# Patient Record
Sex: FEMALE | Race: OTHER | Employment: PART TIME | URBAN - METROPOLITAN AREA
[De-identification: names, ages, dates, MRNs, and addresses within clinical notes are randomized per-mention and may not be internally consistent; named-entity substitution may affect disease eponyms.]

---

## 2017-01-11 PROBLEM — O30.009 TWIN PREGNANCY, TWINS DISCORDANT: Status: ACTIVE | Noted: 2017-01-11

## 2017-01-11 PROBLEM — O36.5990 TWIN PREGNANCY, TWINS DISCORDANT: Status: ACTIVE | Noted: 2017-01-11

## 2017-01-11 RX ORDER — TERBUTALINE SULFATE 1 MG/ML
0.25 INJECTION SUBCUTANEOUS
Status: CANCELLED | OUTPATIENT
Start: 2017-01-11

## 2017-01-11 RX ORDER — SODIUM CHLORIDE 0.9 % (FLUSH) 0.9 %
5-10 SYRINGE (ML) INJECTION EVERY 8 HOURS
Status: CANCELLED | OUTPATIENT
Start: 2017-01-11

## 2017-01-11 RX ORDER — SODIUM CHLORIDE 0.9 % (FLUSH) 0.9 %
5-10 SYRINGE (ML) INJECTION AS NEEDED
Status: CANCELLED | OUTPATIENT
Start: 2017-01-11

## 2017-01-11 RX ORDER — OXYTOCIN IN 5 % DEXTROSE 30/500 ML
.5-2 PLASTIC BAG, INJECTION (ML) INTRAVENOUS CONTINUOUS
Status: CANCELLED | OUTPATIENT
Start: 2017-01-12

## 2017-01-12 ENCOUNTER — ANESTHESIA (OUTPATIENT)
Dept: LABOR AND DELIVERY | Age: 42
End: 2017-01-12
Payer: COMMERCIAL

## 2017-01-12 ENCOUNTER — ANESTHESIA EVENT (OUTPATIENT)
Dept: LABOR AND DELIVERY | Age: 42
End: 2017-01-12
Payer: COMMERCIAL

## 2017-01-12 PROCEDURE — 77030007879 HC KT SPN EPDRL TELE -B: Performed by: ANESTHESIOLOGY

## 2017-01-12 PROCEDURE — 74011000250 HC RX REV CODE- 250

## 2017-01-12 PROCEDURE — 74011250636 HC RX REV CODE- 250/636

## 2017-01-12 RX ORDER — LIDOCAINE HYDROCHLORIDE AND EPINEPHRINE 15; 5 MG/ML; UG/ML
INJECTION, SOLUTION EPIDURAL AS NEEDED
Status: DISCONTINUED | OUTPATIENT
Start: 2017-01-12 | End: 2017-01-12 | Stop reason: HOSPADM

## 2017-01-12 RX ORDER — OXYTOCIN/RINGER'S LACTATE 20/1000 ML
PLASTIC BAG, INJECTION (ML) INTRAVENOUS
Status: DISCONTINUED | OUTPATIENT
Start: 2017-01-12 | End: 2017-01-12 | Stop reason: HOSPADM

## 2017-01-12 RX ORDER — FENTANYL CITRATE 50 UG/ML
INJECTION, SOLUTION INTRAMUSCULAR; INTRAVENOUS AS NEEDED
Status: DISCONTINUED | OUTPATIENT
Start: 2017-01-12 | End: 2017-01-12 | Stop reason: HOSPADM

## 2017-01-12 RX ADMIN — LIDOCAINE HYDROCHLORIDE AND EPINEPHRINE 5 ML: 15; 5 INJECTION, SOLUTION EPIDURAL at 05:33

## 2017-01-12 RX ADMIN — FENTANYL CITRATE 100 MCG: 50 INJECTION, SOLUTION INTRAMUSCULAR; INTRAVENOUS at 05:34

## 2017-01-12 RX ADMIN — Medication: at 08:14

## 2017-01-12 NOTE — ANESTHESIA PREPROCEDURE EVALUATION
Anesthetic History   No history of anesthetic complications            Review of Systems / Medical History  Patient summary reviewed, nursing notes reviewed and pertinent labs reviewed    Pulmonary  Within defined limits                 Neuro/Psych   Within defined limits           Cardiovascular                  Exercise tolerance: >4 METS     GI/Hepatic/Renal  Within defined limits              Endo/Other  Within defined limits           Other Findings              Physical Exam    Airway  Mallampati: II  TM Distance: 4 - 6 cm  Neck ROM: normal range of motion        Cardiovascular  Regular rate and rhythm,  S1 and S2 normal,  no murmur, click, rub, or gallop             Dental  No notable dental hx       Pulmonary  Breath sounds clear to auscultation               Abdominal  GI exam deferred       Other Findings            Anesthetic Plan    ASA: 2  Anesthesia type: epidural      Post-op pain plan if not by surgeon: indwelling epidural catheter      Anesthetic plan and risks discussed with: Patient    Risk and benefits fully explained to the patient including bleeding, headache, nerve damage, infection, nausea, back pain, and hemodynamic changes. Patient understands and agrees to the procedure.

## 2017-01-12 NOTE — ANESTHESIA POSTPROCEDURE EVALUATION
Post-Anesthesia Evaluation and Assessment    Cardiovascular Function/Vital Signs  Visit Vitals    /75    Pulse 77    Temp 36.5 °C (97.7 °F)    Resp 20    Ht 5' 3\" (1.6 m)    Wt 73.5 kg (162 lb)    SpO2 98%    Breastfeeding Unknown    BMI 28.7 kg/m2       Patient is status post Twin Delivery    Nausea/Vomiting: Controlled. Postoperative hydration reviewed and adequate. Pain:  Pain Scale 1: Numeric (0 - 10) (01/12/17 0120)  Pain Intensity 1: 3 (01/11/17 2314)   Managed. Neurological Status:   Neuro (WDL): Within Defined Limits (01/11/17 2314)   At baseline. Mental Status and Level of Consciousness: Baseline and stable. Pulmonary Status:   O2 Device: Room air (01/12/17 0820)   Adequate oxygenation and airway patent. Complications related to anesthesia: None    Post-anesthesia assessment completed. No concerns. Patient has met all discharge requirements.     Signed By: Ene Phipps MD

## 2017-01-12 NOTE — ANESTHESIA PROCEDURE NOTES
Epidural Block    Start time: 1/12/2017 5:26 AM  End time: 1/12/2017 5:34 AM  Performed by: Jair Chacon by: Unk Savage     Pre-Procedure  Indication: at surgeon's request, post-op pain management, procedure for pain, primary anesthetic and labor epidural    Preanesthetic Checklist: patient identified, risks and benefits discussed, anesthesia consent, site marked, patient being monitored, timeout performed and anesthesia consent      Epidural:   Patient position:  Seated  Prep region:  Lumbar  Prep: Chlorhexidine    Location:  L3-4    Needle and Epidural Catheter:   Needle Type:  Tuohy  Needle Gauge:  17 G  Injection Technique:  Loss of resistance using saline  Attempts:  1  Catheter Size:  20 G  Catheter at Skin Depth (cm):  12  Depth in Epidural Space (cm):  5  Events: no blood with aspiration, no cerebrospinal fluid with aspiration, no paresthesia and negative aspiration test    Test Dose:  Lidocaine 1.5% w/ epi and negative    Assessment:   Catheter Secured:  Tegaderm and tape  Insertion:  Uncomplicated  Patient tolerance:  Patient tolerated the procedure well with no immediate complications

## 2017-01-13 NOTE — ANESTHESIA POSTPROCEDURE EVALUATION
Residual numbness:  No  Residual weakness:  No  Residual tingling:   No    Problems with ambulation:   No  Problems with urination:  No    Presence of headache:   No  Presence of backache:   Yes. States it is getting better. She will inform staff if it persists.

## 2017-01-14 PROBLEM — O99.019 ANEMIA DURING PREGNANCY: Status: ACTIVE | Noted: 2017-01-14

## 2017-04-18 ENCOUNTER — HOSPITAL ENCOUNTER (OUTPATIENT)
Dept: PREADMISSION TESTING | Age: 42
Discharge: HOME | End: 2017-04-18
Payer: COMMERCIAL

## 2017-04-18 LAB
HCT VFR BLD AUTO: 36.8 % (ref 35–45)
HGB BLD-MCNC: 12.2 G/DL (ref 12–16)

## 2017-04-18 PROCEDURE — 36415 COLL VENOUS BLD VENIPUNCTURE: CPT | Performed by: SURGERY

## 2017-04-18 PROCEDURE — 85018 HEMOGLOBIN: CPT | Performed by: SURGERY

## 2017-05-02 NOTE — H&P
Assessment/Plan  # Detail Type Description    1. Assessment Lump in breast (N63). Impression left breast mass, discussed possibilities of biopsy, suggest excisional bx as I'm not very suspicious of this mass. Patient Plan left breast bx                  This 39year old female presents for Breast lump mass. History of Present Illness:  1. Breast lump mass   Discovered 2 weeks ago. The method of discovery was breast self exam.  Status: asymptomatic. The Patient reports no pain. Mammography was done; the views are standard and cone down compression; the category was 4; the finding(s) is/are smooth-walled mass and 13 mm mass 2 ocl. The mass location is left upper outer quadrant and Left Breast.  The mass can be described as firm. Context includes menstrual.  Denies aggravating factors. Denies relieving factors. Associated symptoms include Pain in Right Axillary. Pertinent negatives include asymmetry, breast pain, dimpling, erythema, fever, lymphadenopathy, nipple itching, nipple retraction and skin changes. PAST MEDICAL/SURGICAL HISTORY  (Detailed)      Family History:  (Detailed)    Social History:  (Detailed)  Tobacco use reviewed. Preferred language is [place]. MARITAL STATUS/FAMILY/SOCIAL SUPPORT  Currently . Smoking status: Never smoker. SMOKING STATUS  Use Status Type Smoking Status Usage Per Day Years Used Total Pack Years   no/never  Never smoker          Latter-day/SPIRITUAL  The patient has None Zoroastrianism affiliation. Medication Reconciliation  Medications reconciled today. Patient is on no medications. Allergies:  Ingredient Reaction Medication Name Comment   NO KNOWN ALLERGIES        Review of Systems  System Neg/Pos Details   Constitutional Positive Night sweats. Constitutional Negative Fever and weight loss. ENMT Positive Vertigo, Voice change. ENMT Negative Hearing loss and tinnitus. Eyes Negative Diplopia and vision loss. Respiratory Negative Asthma, cough, dyspnea, hemoptysis, known TB exposure and wheezing. Cardio Negative Chest pain, claudication, edema, irregular heartbeat/palpitations and thrombophlebitis. GI Positive Hemorrhoids. GI Negative Bloating, dysphagia, jaundice and reflux.  Negative Dysuria, nocturia, passage stone/gravel and urinary incontinence. Endocrine Positive Cold intolerance. Endocrine Negative Goiter. Neuro Negative Headache and syncope. Integumentary Negative Breast asymmetry, breast dimpling, change in shape/size of mole(s), erythema, nipple itching, nipple retraction, skin changes and skin lesion. MS Positive Back pain, Bone/joint symptoms. Hema/Lymph Negative Easy bleeding, easy bruising and lymphadenopathy. Allergic/Immuno Negative Contact allergy and contact dermatitis. Reproductive Positive Menses (Last menses was 03/18/2017). Reproductive Negative Breast pain. Vital Signs     Gynecologic History   Last menses was 03/18/2017. Height  Time ft in cm Last Measured Height Position   9:02 AM 5.0 3.00 160.02 04/10/2017      Weight/BSA/BMI  Time lb oz kg Context BMI kg/m2 BSA m2   9:02 .00  59.874 dressed with shoes 23.38      Blood Pressure  Time BP mm/Hg Position Side Site Method Cuff Size   9:02 /74 sitting right arm automatic pediatric     Temperature/Pulse/Respiration  Time Temp F Temp C Temp Site Pulse/min Pattern Resp/ min   9:02 AM 98.50 36.94 ear 71 regular      Measured By  Time Measured by   9:02 AM Edna Rivera       Physical Exam:  Exam Findings Details   Constitutional Normal No acute distress. Well nourished. Well developed. Eyes Normal General - Right: Normal, Left: Normal. Sclera - Right: Normal, Left: Normal.   Neck Exam Normal Inspection - Normal. Palpation - Normal. Parotid gland - Normal. Thyroid gland - Normal. Submandibular lymph nodes - Normal. Cervical lymph nodes - Normal.   Lymph Detail Normal Occipital. Postauricular. Preauricular. Submental. Submandibular. Parotid. Anterior cervical. Posterior cervical. Supraclavicular. Axillary. Breast * Palpation - Left: mass, Left: 1 cm 2 ocl. Breast Normal Inspection - Bilateral: Normal. Palpation - Right: Normal. Nipples - Bilateral: Normal. Lymph nodes - Normal.   Respiratory Normal Cough - Absent. Effort - Normal.   Cardiovascular Normal Inspection - JVD: Absent. Heart rate - Regular rate. Rhythm - Regular. Skin * Inspection - General inspection: warm and dry. Musculoskeletal Normal Visual overview of all four extremities is normal. Gait - Normal.   Extremity Normal No Cyanosis. No Edema. Neurological Normal Level of consciousness - Normal. Orientation - Normal. Memory - Normal.   Psychiatric Normal Orientation - Oriented to time, place, person & situation. No agitation. Not anxious. Appropriate mood and affect.        Completed by:     Alanna Hercules 04/10/2017 1:15 PM   Document generated by:  Jersey Espana 04/10/2017 01:15 PM     -----------------------------------------------------------------------------------------------------------                          Electronically signed by Jersey Espana MD on 04/19/2017 05:54 PM

## 2017-05-03 ENCOUNTER — ANESTHESIA EVENT (OUTPATIENT)
Dept: SURGERY | Age: 42
End: 2017-05-03
Payer: COMMERCIAL

## 2017-05-03 RX ORDER — SODIUM CHLORIDE, SODIUM LACTATE, POTASSIUM CHLORIDE, CALCIUM CHLORIDE 600; 310; 30; 20 MG/100ML; MG/100ML; MG/100ML; MG/100ML
1000 INJECTION, SOLUTION INTRAVENOUS CONTINUOUS
Status: CANCELLED | OUTPATIENT
Start: 2017-05-03

## 2017-05-03 RX ORDER — MAGNESIUM SULFATE 100 %
4 CRYSTALS MISCELLANEOUS AS NEEDED
Status: CANCELLED | OUTPATIENT
Start: 2017-05-03

## 2017-05-03 RX ORDER — FLUMAZENIL 0.1 MG/ML
0.2 INJECTION INTRAVENOUS
Status: CANCELLED | OUTPATIENT
Start: 2017-05-03

## 2017-05-03 RX ORDER — HYDROMORPHONE HYDROCHLORIDE 2 MG/ML
0.5 INJECTION, SOLUTION INTRAMUSCULAR; INTRAVENOUS; SUBCUTANEOUS
Status: CANCELLED | OUTPATIENT
Start: 2017-05-03

## 2017-05-03 RX ORDER — INSULIN LISPRO 100 [IU]/ML
INJECTION, SOLUTION INTRAVENOUS; SUBCUTANEOUS ONCE
Status: CANCELLED | OUTPATIENT
Start: 2017-05-03 | End: 2017-05-04

## 2017-05-03 RX ORDER — SODIUM CHLORIDE 0.9 % (FLUSH) 0.9 %
5-10 SYRINGE (ML) INJECTION AS NEEDED
Status: CANCELLED | OUTPATIENT
Start: 2017-05-03

## 2017-05-03 RX ORDER — DEXTROSE 50 % IN WATER (D50W) INTRAVENOUS SYRINGE
25-50 AS NEEDED
Status: CANCELLED | OUTPATIENT
Start: 2017-05-03

## 2017-05-03 RX ORDER — NALOXONE HYDROCHLORIDE 0.4 MG/ML
0.1 INJECTION, SOLUTION INTRAMUSCULAR; INTRAVENOUS; SUBCUTANEOUS AS NEEDED
Status: CANCELLED | OUTPATIENT
Start: 2017-05-03

## 2017-05-04 ENCOUNTER — ANESTHESIA (OUTPATIENT)
Dept: SURGERY | Age: 42
End: 2017-05-04
Payer: COMMERCIAL

## 2017-05-04 ENCOUNTER — HOSPITAL ENCOUNTER (OUTPATIENT)
Age: 42
Setting detail: OUTPATIENT SURGERY
Discharge: HOME | End: 2017-05-04
Attending: SURGERY | Admitting: SURGERY
Payer: COMMERCIAL

## 2017-05-04 VITALS
BODY MASS INDEX: 23.39 KG/M2 | TEMPERATURE: 98.3 F | SYSTOLIC BLOOD PRESSURE: 116 MMHG | OXYGEN SATURATION: 99 % | HEART RATE: 62 BPM | WEIGHT: 132 LBS | DIASTOLIC BLOOD PRESSURE: 64 MMHG | RESPIRATION RATE: 15 BRPM | HEIGHT: 63 IN

## 2017-05-04 LAB — HCG SERPL QL: NEGATIVE

## 2017-05-04 PROCEDURE — 74011250636 HC RX REV CODE- 250/636: Performed by: SURGERY

## 2017-05-04 PROCEDURE — 76060000031 HC ANESTHESIA FIRST 0.5 HR: Performed by: SURGERY

## 2017-05-04 PROCEDURE — 77030011267 HC ELECTRD BLD COVD -A: Performed by: SURGERY

## 2017-05-04 PROCEDURE — 36415 COLL VENOUS BLD VENIPUNCTURE: CPT | Performed by: SURGERY

## 2017-05-04 PROCEDURE — 77030020782 HC GWN BAIR PAWS FLX 3M -B: Performed by: SURGERY

## 2017-05-04 PROCEDURE — 77030002916 HC SUT ETHLN J&J -A: Performed by: SURGERY

## 2017-05-04 PROCEDURE — 76210000021 HC REC RM PH II 0.5 TO 1 HR: Performed by: SURGERY

## 2017-05-04 PROCEDURE — 84703 CHORIONIC GONADOTROPIN ASSAY: CPT | Performed by: SURGERY

## 2017-05-04 PROCEDURE — 77030011640 HC PAD GRND REM COVD -A: Performed by: SURGERY

## 2017-05-04 PROCEDURE — 74011000272 HC RX REV CODE- 272: Performed by: SURGERY

## 2017-05-04 PROCEDURE — 88305 TISSUE EXAM BY PATHOLOGIST: CPT | Performed by: SURGERY

## 2017-05-04 PROCEDURE — 76010000154 HC OR TIME FIRST 0.5 HR: Performed by: SURGERY

## 2017-05-04 PROCEDURE — 76210000006 HC OR PH I REC 0.5 TO 1 HR: Performed by: SURGERY

## 2017-05-04 PROCEDURE — 77030010507 HC ADH SKN DERMBND J&J -B: Performed by: SURGERY

## 2017-05-04 PROCEDURE — 74011250636 HC RX REV CODE- 250/636

## 2017-05-04 PROCEDURE — 77030002935 HC SUT MCRYL J&J -C: Performed by: SURGERY

## 2017-05-04 PROCEDURE — 74011000250 HC RX REV CODE- 250

## 2017-05-04 PROCEDURE — 77030012508 HC MSK AIRWY LMA AMBU -A: Performed by: NURSE ANESTHETIST, CERTIFIED REGISTERED

## 2017-05-04 PROCEDURE — 74011000250 HC RX REV CODE- 250: Performed by: SURGERY

## 2017-05-04 RX ORDER — HYDROCODONE BITARTRATE AND ACETAMINOPHEN 5; 300 MG/1; MG/1
1 TABLET ORAL
Qty: 20 TAB | Refills: 0 | Status: SHIPPED | OUTPATIENT
Start: 2017-05-04

## 2017-05-04 RX ORDER — MIDAZOLAM HYDROCHLORIDE 1 MG/ML
INJECTION, SOLUTION INTRAMUSCULAR; INTRAVENOUS AS NEEDED
Status: DISCONTINUED | OUTPATIENT
Start: 2017-05-04 | End: 2017-05-04 | Stop reason: HOSPADM

## 2017-05-04 RX ORDER — LIDOCAINE HYDROCHLORIDE 20 MG/ML
INJECTION, SOLUTION EPIDURAL; INFILTRATION; INTRACAUDAL; PERINEURAL AS NEEDED
Status: DISCONTINUED | OUTPATIENT
Start: 2017-05-04 | End: 2017-05-04 | Stop reason: HOSPADM

## 2017-05-04 RX ORDER — ONDANSETRON 2 MG/ML
INJECTION INTRAMUSCULAR; INTRAVENOUS AS NEEDED
Status: DISCONTINUED | OUTPATIENT
Start: 2017-05-04 | End: 2017-05-04 | Stop reason: HOSPADM

## 2017-05-04 RX ORDER — PROPOFOL 10 MG/ML
INJECTION, EMULSION INTRAVENOUS AS NEEDED
Status: DISCONTINUED | OUTPATIENT
Start: 2017-05-04 | End: 2017-05-04 | Stop reason: HOSPADM

## 2017-05-04 RX ORDER — DEXAMETHASONE SODIUM PHOSPHATE 4 MG/ML
INJECTION, SOLUTION INTRA-ARTICULAR; INTRALESIONAL; INTRAMUSCULAR; INTRAVENOUS; SOFT TISSUE AS NEEDED
Status: DISCONTINUED | OUTPATIENT
Start: 2017-05-04 | End: 2017-05-04 | Stop reason: HOSPADM

## 2017-05-04 RX ORDER — SODIUM CHLORIDE, SODIUM LACTATE, POTASSIUM CHLORIDE, CALCIUM CHLORIDE 600; 310; 30; 20 MG/100ML; MG/100ML; MG/100ML; MG/100ML
125 INJECTION, SOLUTION INTRAVENOUS CONTINUOUS
Status: DISCONTINUED | OUTPATIENT
Start: 2017-05-04 | End: 2017-05-04 | Stop reason: HOSPADM

## 2017-05-04 RX ORDER — BUPIVACAINE HYDROCHLORIDE 5 MG/ML
INJECTION, SOLUTION EPIDURAL; INTRACAUDAL AS NEEDED
Status: DISCONTINUED | OUTPATIENT
Start: 2017-05-04 | End: 2017-05-04 | Stop reason: HOSPADM

## 2017-05-04 RX ORDER — FENTANYL CITRATE 50 UG/ML
INJECTION, SOLUTION INTRAMUSCULAR; INTRAVENOUS AS NEEDED
Status: DISCONTINUED | OUTPATIENT
Start: 2017-05-04 | End: 2017-05-04 | Stop reason: HOSPADM

## 2017-05-04 RX ADMIN — FENTANYL CITRATE 50 MCG: 50 INJECTION, SOLUTION INTRAMUSCULAR; INTRAVENOUS at 11:32

## 2017-05-04 RX ADMIN — LIDOCAINE HYDROCHLORIDE 60 MG: 20 INJECTION, SOLUTION EPIDURAL; INFILTRATION; INTRACAUDAL; PERINEURAL at 11:23

## 2017-05-04 RX ADMIN — MIDAZOLAM HYDROCHLORIDE 2 MG: 1 INJECTION, SOLUTION INTRAMUSCULAR; INTRAVENOUS at 11:18

## 2017-05-04 RX ADMIN — DEXAMETHASONE SODIUM PHOSPHATE 4 MG: 4 INJECTION, SOLUTION INTRA-ARTICULAR; INTRALESIONAL; INTRAMUSCULAR; INTRAVENOUS; SOFT TISSUE at 11:30

## 2017-05-04 RX ADMIN — SODIUM CHLORIDE, SODIUM LACTATE, POTASSIUM CHLORIDE, AND CALCIUM CHLORIDE: 600; 310; 30; 20 INJECTION, SOLUTION INTRAVENOUS at 11:39

## 2017-05-04 RX ADMIN — PROPOFOL 150 MG: 10 INJECTION, EMULSION INTRAVENOUS at 11:23

## 2017-05-04 RX ADMIN — ONDANSETRON 4 MG: 2 INJECTION INTRAMUSCULAR; INTRAVENOUS at 11:30

## 2017-05-04 RX ADMIN — SODIUM CHLORIDE, SODIUM LACTATE, POTASSIUM CHLORIDE, AND CALCIUM CHLORIDE 125 ML/HR: 600; 310; 30; 20 INJECTION, SOLUTION INTRAVENOUS at 10:04

## 2017-05-04 NOTE — ANESTHESIA POSTPROCEDURE EVALUATION
Post-Anesthesia Evaluation and Assessment    Patient: Love Koch MRN: [id number]  SSN: [id number]   YOB: 1975  Age: 39 y.o. Sex: female      Cardiovascular Function/Vital Signs  /58  Pulse (!) 53  Temp 37.1 °C (98.7 °F)  Resp 14  Ht 5' 3\" (1.6 m)  Wt 59.9 kg (132 lb)  LMP 03/18/2017  SpO2 100%  BMI 23.38 kg/m2    Patient is status post Procedure(s):  EXCISION LEFT BREAST MASS. Nausea/Vomiting: Controlled. Postoperative hydration reviewed and adequate. Pain:  Pain Scale 1: Numeric (0 - 10) (05/04/17 1235)  Pain Intensity 1: 0 (05/04/17 1235)   Managed. Neurological Status:   Neuro (WDL): Within Defined Limits (05/04/17 1220)   At baseline. Mental Status and Level of Consciousness: Awake/alert    Pulmonary Status:   O2 Device: Room air (05/04/17 1220)   Adequate oxygenation and airway patent. Complications related to anesthesia: None    Post-anesthesia assessment completed. No concerns. Patient has met all discharge requirements.     Signed By: Callie Downs CRNA    May 4, 2017

## 2017-05-04 NOTE — ANESTHESIA PREPROCEDURE EVALUATION
Anesthetic History   No history of anesthetic complications            Review of Systems / Medical History  Patient summary reviewed, nursing notes reviewed and pertinent labs reviewed    Pulmonary  Within defined limits                 Neuro/Psych   Within defined limits           Cardiovascular  Within defined limits                Exercise tolerance: >4 METS     GI/Hepatic/Renal  Within defined limits              Endo/Other  Within defined limits           Other Findings              Physical Exam    Airway  Mallampati: II  TM Distance: 4 - 6 cm  Neck ROM: normal range of motion   Mouth opening: Normal     Cardiovascular  Regular rate and rhythm,  S1 and S2 normal,  no murmur, click, rub, or gallop  Rhythm: regular  Rate: normal         Dental         Pulmonary  Breath sounds clear to auscultation               Abdominal  GI exam deferred       Other Findings            Anesthetic Plan    ASA: 1  Anesthesia type: general          Induction: Intravenous  Anesthetic plan and risks discussed with: Patient

## 2017-05-04 NOTE — PERIOP NOTES
TRANSFER - OUT REPORT:    Verbal report given to LUISA Storey RN(name) on [address]  being transferred to Phase 2(unit) for routine post - op       Report consisted of patients Situation, Background, Assessment and   Recommendations(SBAR). Information from the following report(s) SBAR, OR Summary, Intake/Output and MAR was reviewed with the receiving nurse. Lines:   Peripheral IV 55/45/27 Right Cephalic (Active)   Site Assessment Clean, dry, & intact 5/4/2017 10:00 AM   Phlebitis Assessment 0 5/4/2017 10:00 AM   Infiltration Assessment 0 5/4/2017 10:00 AM   Dressing Status Clean, dry, & intact 5/4/2017 10:00 AM   Dressing Type Tape;Transparent 5/4/2017 10:00 AM   Hub Color/Line Status Pink; Infusing 5/4/2017 10:00 AM        Opportunity for questions and clarification was provided.       Patient transported with:   [employer]

## 2017-05-04 NOTE — INTERVAL H&P NOTE
H&P Update:  Brandee Austin was seen and examined. History and physical has been reviewed. The patient has been examined. There have been no significant clinical changes since the completion of the originally dated History and Physical.  Patient identified by surgeon; surgical site was confirmed by patient and surgeon.     Signed By: Andi Rodriguez MD     May 4, 2017 10:50 AM

## 2017-05-04 NOTE — IP AVS SNAPSHOT
[address] [address] 
[phone] Patient: Bryanna Barragan MRN: [id number] :1975 You are allergic to the following Allergen Reactions Latex Itching Recent Documentation Height Weight BMI OB Status Smoking Status 1.6 m 59.9 kg 23.38 kg/m2 Having regular periods Never Smoker Emergency Contacts Name Discharge Info Relation Home Work Mobile [phone] CAREGIVER [3] Father [15] [phone] About your hospitalization You were admitted on: May 4, 2017 You last received care in the:  [facility] PACU You were discharged on: May 4, 2017 Unit phone number:  [phone] Why you were hospitalized Your primary diagnosis was:  Not on File Providers Seen During Your Hospitalizations Provider Role Specialty Primary office phone Cassandra Doll MD Attending Provider General Surgery [phone] Your Primary Care Physician (PCP) Primary Care Physician Office Phone Office Fax OTHER, KENTRELL ** None ** ** None ** Follow-up Information Follow up With Details Comments Contact Info Kentrell Miranda MD   Patient can only remember the practice name and not the physician Cassandra Doll MD Follow up in 1 week(s)  [address] [facility] [address] [facility] 
[phone] Current Discharge Medication List  
  
START taking these medications Dose & Instructions Dispensing Information Comments Morning Noon Evening Bedtime HYDROcodone-acetaminophen 5-300 mg tablet Commonly known as:  Hayley Bardales Your last dose was: Your next dose is:    
   
   
 Dose:  1 Tab Take 1 Tab by mouth every six (6) hours as needed. Max Daily Amount: 4 Tabs. Quantity:  20 Tab Refills:  0 CONTINUE these medications which have NOT CHANGED Dose & Instructions Dispensing Information Comments Morning Noon Evening Bedtime Iron 160 mg (50 mg iron) Tber tablet Generic drug:  ferrous sulfate ER Your last dose was: Your next dose is:    
   
   
 Dose:  1 Tab Take 1 Tab by mouth daily. Indications: IRON DEFICIENCY ANEMIA Refills:  0  
     
   
   
   
  
 multivitamin tablet Commonly known as:  ONE A DAY Your last dose was: Your next dose is:    
   
   
 Dose:  1 Tab Take 1 Tab by mouth daily. Refills:  0 PROBIOTIC 4X 10-15 mg Tbec Generic drug:  B.infantis-B.ani-B.long-B.bifi Your last dose was: Your next dose is: Take  by mouth. Refills:  0  
     
   
   
   
  
 VITAMIN D3 1,000 unit Cap Generic drug:  cholecalciferol Your last dose was: Your next dose is: Take  by mouth. Refills:  0 Where to Get Your Medications Information on where to get these meds will be given to you by the nurse or doctor. ! Ask your nurse or doctor about these medications HYDROcodone-acetaminophen 5-300 mg tablet Discharge Instructions DISCHARGE SUMMARY from Nurse The following personal items are in your possession at time of discharge: 
 
Dental Appliances: None Visual Aid: None Home Medications: None Jewelry: None Clothing: Pants, Shirt, Undergarments, Footwear, Socks, Jacket/Coat (With Dannie Scripture) Other Valuables: "[employer]", Cell Phone, [facility] (with Dannie Scripture) PATIENT INSTRUCTIONS: 
 
After general anesthesia or intravenous sedation, for 24 hours or while taking prescription Narcotics: · Limit your activities · Do not drive and operate hazardous machinery · Do not make important personal or business decisions · Do  not drink alcoholic beverages · If you have not urinated within 8 hours after discharge, please contact your surgeon on call. Report the following to your surgeon: 
· Excessive pain, swelling, redness or odor of or around the surgical area · Temperature over 100.5 · Nausea and vomiting lasting longer than 4 hours or if unable to take medications · Any signs of decreased circulation or nerve impairment to extremity: change in color, persistent  numbness, tingling, coldness or increase pain · Any questions What to do at Home: 
Recommended activity: Ambulate in house and No driving while on analgesics, you can shower tomorrow, do not soak incision in water, do not put ointment, cream or oil on incision. No lifting above 10 pounds. Use ice 20 minutes on 20 minutes off over incision for swelling and pain If you experience any of the following symptoms temperature above 101.0, bleeding, swelling, drainage or redness from incison, pain not relieved by medication, nausea or vomiting please follow up with Dr. Luis Busby. *  Please give a list of your current medications to your Primary Care Provider. *  Please update this list whenever your medications are discontinued, doses are 
    changed, or new medications (including over-the-counter products) are added. *  Please carry medication information at all times in case of emergency situations. These are general instructions for a healthy lifestyle: No smoking/ No tobacco products/ Avoid exposure to second hand smoke Surgeon General's Warning:  Quitting smoking now greatly reduces serious risk to your health. Obesity, smoking, and sedentary lifestyle greatly increases your risk for illness A healthy diet, regular physical exercise & weight monitoring are important for maintaining a healthy lifestyle You may be retaining fluid if you have a history of heart failure or if you experience any of the following symptoms:  Weight gain of 3 pounds or more overnight or 5 pounds in a week, increased swelling in our hands or feet or shortness of breath while lying flat in bed. Please call your doctor as soon as you notice any of these symptoms; do not wait until your next office visit. Recognize signs and symptoms of STROKE: 
 
F-face looks uneven A-arms unable to move or move unevenly S-speech slurred or non-existent T-time-call 911 as soon as signs and symptoms begin-DO NOT go Back to bed or wait to see if you get better-TIME IS BRAIN. Warning Signs of HEART ATTACK Call 911 if you have these symptoms: 
? Chest discomfort. Most heart attacks involve discomfort in the center of the chest that lasts more than a few minutes, or that goes away and comes back. It can feel like uncomfortable pressure, squeezing, fullness, or pain. ? Discomfort in other areas of the upper body. Symptoms can include pain or discomfort in one or both arms, the back, neck, jaw, or stomach. ? Shortness of breath with or without chest discomfort. ? Other signs may include breaking out in a cold sweat, nausea, or lightheadedness. Don't wait more than five minutes to call [address]! Fast action can save your life. Calling 911 is almost always the fastest way to get lifesaving treatment. Emergency Medical Services staff can begin treatment when they arrive  up to an hour sooner than if someone gets to the hospital by car. The discharge information has been reviewed with the patient and caregiver. The patient and caregiver verbalized understanding. Discharge medications reviewed with the patient and caregiver and appropriate educational materials and side effects teaching were provided. Patient armband removed and shredded Discharge Orders None Introducing [facility]! Josefa Mccall introduces [employer] patient portal. Now you can access parts of your medical record, email your doctor's office, and request medication refills online.    
 
1. In your internet browser, go to [email or web address] 2. Click on the First Time User? Click Here link in the Sign In box. You will see the New Member Sign Up page. 3. Enter your [facility] Access Code exactly as it appears below. You will not need to use this code after youve completed the sign-up process. If you do not sign up before the expiration date, you must request a new code. · [facility] Access Code: [email or web address] Expires: 7/9/2017  3:30 PM 
 
4. Enter the last four digits of your Social Security Number (xxxx) and Date of Birth (mm/dd/yyyy) as indicated and click Submit. You will be taken to the next sign-up page. 5. Create a [facility] ID. This will be your [facility] login ID and cannot be changed, so think of one that is secure and easy to remember. 6. Create a [facility] password. You can change your password at any time. 7. Enter your Password Reset Question and Answer. This can be used at a later time if you forget your password. 8. Enter your e-mail address. You will receive e-mail notification when new information is available in [address] 9. Click Sign Up. You can now view and download portions of your medical record. 10. Click the Download Summary menu link to download a portable copy of your medical information. If you have questions, please visit the Frequently Asked Questions section of the [facility] website. Remember, [facility] is NOT to be used for urgent needs. For medical emergencies, dial 911. Now available from your iPhone and Android! General Information Please provide this summary of care documentation to your next provider. Patient Signature:  ____________________________________________________________ Date:  ____________________________________________________________  
  
Lacey Chard Provider Signature:  ____________________________________________________________ Date:  ____________________________________________________________

## 2017-05-04 NOTE — OP NOTES
[address] [place] [address]  OPERATIVE REPORT    Name:  Kirsten Hopkins  MR#:  [id number]  :  1975  Account #:  [de-identified]  Date of Adm:  2017  Date of Surgery:  2017      PREOPERATIVE DIAGNOSIS: Left breast mass. POSTOPERATIVE DIAGNOSIS: Left breast mass. PROCEDURES PERFORMED: Excisional left breast biopsy. ATTENDING SURGEON: Jose Elliott MD.    ANESTHESIA: General and local.    INDICATIONS FOR PROCEDURE: This is a 42-year-old female with a  firm, small, superficial mass in the left upper outer quadrant breast.  She is brought to the operating room for excisional biopsy. DESCRIPTION OF PROCEDURE: The patient was brought in the  operating room, placed on the table in the supine position. After  placing monitors and adequate general anesthesia, the left breast was  prepped and draped in the usual sterile fashion. A slightly curved  incision was made over the mass through the skin down to  subcutaneous tissue. Then, using electrocautery dissection, a firm  mass was excised from the surrounding normal soft tissue. It was sent  to Pathology in formalin. There was good hemostasis with  electrocautery. Marcaine was injected locally. Subcutaneous tissue  was reapproximated with interrupted 3-0 Monocryl suture and 4-0  Monocryl was used to reapproximate the skin. Steri-Strips were  applied and the wounds were dressed sterilely. The sponge,  instrument, and needle count was correct at the end of procedure. ESTIMATED BLOOD LOSS: 2 mL. SPECIMENS REMOVED: Left breast mass.         Patty Landers MD    EO / Thelma Bolus  D:  2017   11:41  T:  2017   12:12  Job #:  [id number]

## 2017-05-04 NOTE — PERIOP NOTES
AVS med list reviewed and verified by MARTIN Castellon RN, no duplicate medications noted    Discharge paperwork reviewed for correct name by pt's neighbor Yareli, who will be staying with pt over night. Common medication side effect worksheet given to pt.

## 2017-05-04 NOTE — PERIOP NOTES
TRANSFER - IN REPORT:    Verbal report received from Meli Zayas and ORN(name) on [address]  being received from [facility]) for routine post - op      Report consisted of patients Situation, Background, Assessment and   Recommendations(SBAR). Information from the following report(s) SBAR, OR Summary, Intake/Output and MAR was reviewed with the receiving nurse. Opportunity for questions and clarification was provided. Assessment completed upon patients arrival to unit and care assumed.

## 2017-05-04 NOTE — DISCHARGE INSTRUCTIONS
DISCHARGE SUMMARY from Nurse    The following personal items are in your possession at time of discharge:    Dental Appliances: None  Visual Aid: None     Home Medications: None  Jewelry: None  Clothing: Pants, Shirt, Undergarments, Footwear, Socks, Jacket/Coat (With Sivakumar Tonis)  Other Valuables: Wallet, Cell Phone, Keys (with Megha)             PATIENT INSTRUCTIONS:    After general anesthesia or intravenous sedation, for 24 hours or while taking prescription Narcotics:  · Limit your activities  · Do not drive and operate hazardous machinery  · Do not make important personal or business decisions  · Do  not drink alcoholic beverages  · If you have not urinated within 8 hours after discharge, please contact your surgeon on call. Report the following to your surgeon:  · Excessive pain, swelling, redness or odor of or around the surgical area  · Temperature over 100.5  · Nausea and vomiting lasting longer than 4 hours or if unable to take medications  · Any signs of decreased circulation or nerve impairment to extremity: change in color, persistent  numbness, tingling, coldness or increase pain  · Any questions        What to do at Home:  Recommended activity: Ambulate in house and No driving while on analgesics, you can shower tomorrow, do not soak incision in water, do not put ointment, cream or oil on incision. No lifting above 10 pounds. Use ice 20 minutes on 20 minutes off over incision for swelling and pain     If you experience any of the following symptoms temperature above 101.0, bleeding, swelling, drainage or redness from incison, pain not relieved by medication, nausea or vomiting please follow up with Dr. Barbi Monge. *  Please give a list of your current medications to your Primary Care Provider. *  Please update this list whenever your medications are discontinued, doses are      changed, or new medications (including over-the-counter products) are added.     *  Please carry medication information at all times in case of emergency situations. These are general instructions for a healthy lifestyle:    No smoking/ No tobacco products/ Avoid exposure to second hand smoke    Surgeon General's Warning:  Quitting smoking now greatly reduces serious risk to your health. Obesity, smoking, and sedentary lifestyle greatly increases your risk for illness    A healthy diet, regular physical exercise & weight monitoring are important for maintaining a healthy lifestyle    You may be retaining fluid if you have a history of heart failure or if you experience any of the following symptoms:  Weight gain of 3 pounds or more overnight or 5 pounds in a week, increased swelling in our hands or feet or shortness of breath while lying flat in bed. Please call your doctor as soon as you notice any of these symptoms; do not wait until your next office visit. Recognize signs and symptoms of STROKE:    F-face looks uneven    A-arms unable to move or move unevenly    S-speech slurred or non-existent    T-time-call 911 as soon as signs and symptoms begin-DO NOT go       Back to bed or wait to see if you get better-TIME IS BRAIN. Warning Signs of HEART ATTACK     Call 911 if you have these symptoms:   Chest discomfort. Most heart attacks involve discomfort in the center of the chest that lasts more than a few minutes, or that goes away and comes back. It can feel like uncomfortable pressure, squeezing, fullness, or pain.  Discomfort in other areas of the upper body. Symptoms can include pain or discomfort in one or both arms, the back, neck, jaw, or stomach.  Shortness of breath with or without chest discomfort.  Other signs may include breaking out in a cold sweat, nausea, or lightheadedness. Don't wait more than five minutes to call 911 - MINUTES MATTER! Fast action can save your life. Calling 911 is almost always the fastest way to get lifesaving treatment.  Emergency Medical Services staff can begin treatment when they arrive -- up to an hour sooner than if someone gets to the hospital by car. The discharge information has been reviewed with the patient and caregiver. The patient and caregiver verbalized understanding. Discharge medications reviewed with the patient and caregiver and appropriate educational materials and side effects teaching were provided.   Patient armband removed and shredded

## (undated) DEVICE — REM POLYHESIVE ADULT PATIENT RETURN ELECTRODE: Brand: VALLEYLAB

## (undated) DEVICE — BSHR MAJOR BASIN PACK-LF: Brand: MEDLINE INDUSTRIES, INC.

## (undated) DEVICE — (D)PREP SKN CHLRAPRP APPL 26ML -- CONVERT TO ITEM 371833

## (undated) DEVICE — DEVON™ KNEE AND BODY STRAP 60" X 3" (1.5 M X 7.6 CM): Brand: DEVON

## (undated) DEVICE — SUTURE ETHLN SZ 4-0 L18IN NONABSORBABLE BLK L19MM PS-2 3/8 1667H

## (undated) DEVICE — (D)STRIP SKN CLSR 0.5X4IN WHT --

## (undated) DEVICE — SUT MONOCRYL PLUS UD 4-0 --

## (undated) DEVICE — INTENDED FOR TISSUE SEPARATION, AND OTHER PROCEDURES THAT REQUIRE A SHARP SURGICAL BLADE TO PUNCTURE OR CUT.: Brand: BARD-PARKER ® CARBON RIB-BACK BLADES

## (undated) DEVICE — DERMABOND SKIN ADH 0.7ML -- DERMABOND ADVANCED 12/BX

## (undated) DEVICE — INSULATED BLADE ELECTRODE: Brand: EDGE

## (undated) DEVICE — GOWN,AURORA,NONRNF,XL,30/CS: Brand: MEDLINE

## (undated) DEVICE — DBD-PACK,LAPAROTOMY,2 REINFORCED GOWNS: Brand: MEDLINE